# Patient Record
Sex: MALE | Race: WHITE | NOT HISPANIC OR LATINO
[De-identification: names, ages, dates, MRNs, and addresses within clinical notes are randomized per-mention and may not be internally consistent; named-entity substitution may affect disease eponyms.]

---

## 2020-10-12 ENCOUNTER — TRANSCRIPTION ENCOUNTER (OUTPATIENT)
Age: 65
End: 2020-10-12

## 2022-04-04 PROBLEM — Z00.00 ENCOUNTER FOR PREVENTIVE HEALTH EXAMINATION: Status: ACTIVE | Noted: 2022-04-04

## 2022-04-06 ENCOUNTER — APPOINTMENT (OUTPATIENT)
Dept: NEUROSURGERY | Facility: CLINIC | Age: 67
End: 2022-04-06
Payer: COMMERCIAL

## 2022-04-06 DIAGNOSIS — M47.816 SPONDYLOSIS W/OUT MYELOPATHY OR RADICULOPATHY, LUMBAR REGION: ICD-10-CM

## 2022-04-06 DIAGNOSIS — M54.16 RADICULOPATHY, LUMBAR REGION: ICD-10-CM

## 2022-04-06 PROCEDURE — 99203 OFFICE O/P NEW LOW 30 MIN: CPT | Mod: 95

## 2022-05-06 ENCOUNTER — INPATIENT (INPATIENT)
Facility: HOSPITAL | Age: 67
LOS: 0 days | Discharge: HOME | DRG: 93 | End: 2022-05-07
Attending: NEUROLOGICAL SURGERY | Admitting: NEUROLOGICAL SURGERY
Payer: COMMERCIAL

## 2022-05-06 VITALS
SYSTOLIC BLOOD PRESSURE: 166 MMHG | HEART RATE: 83 BPM | OXYGEN SATURATION: 97 % | WEIGHT: 167.99 LBS | DIASTOLIC BLOOD PRESSURE: 96 MMHG | TEMPERATURE: 98 F | RESPIRATION RATE: 18 BRPM | HEIGHT: 70 IN

## 2022-05-06 DIAGNOSIS — M54.9 DORSALGIA, UNSPECIFIED: ICD-10-CM

## 2022-05-06 DIAGNOSIS — K21.9 GASTRO-ESOPHAGEAL REFLUX DISEASE WITHOUT ESOPHAGITIS: ICD-10-CM

## 2022-05-06 DIAGNOSIS — F90.9 ATTENTION-DEFICIT HYPERACTIVITY DISORDER, UNSPECIFIED TYPE: ICD-10-CM

## 2022-05-06 LAB
ALBUMIN SERPL ELPH-MCNC: 4.5 G/DL — SIGNIFICANT CHANGE UP (ref 3.3–5)
ALP SERPL-CCNC: 84 U/L — SIGNIFICANT CHANGE UP (ref 40–120)
ALT FLD-CCNC: 38 U/L — SIGNIFICANT CHANGE UP (ref 10–45)
ANION GAP SERPL CALC-SCNC: 17 MMOL/L — SIGNIFICANT CHANGE UP (ref 5–17)
APTT BLD: 27.7 SEC — SIGNIFICANT CHANGE UP (ref 27.5–35.5)
AST SERPL-CCNC: 30 U/L — SIGNIFICANT CHANGE UP (ref 10–40)
BILIRUB SERPL-MCNC: 0.4 MG/DL — SIGNIFICANT CHANGE UP (ref 0.2–1.2)
BLD GP AB SCN SERPL QL: NEGATIVE — SIGNIFICANT CHANGE UP
BUN SERPL-MCNC: 14 MG/DL — SIGNIFICANT CHANGE UP (ref 7–23)
CALCIUM SERPL-MCNC: 9.3 MG/DL — SIGNIFICANT CHANGE UP (ref 8.4–10.5)
CHLORIDE SERPL-SCNC: 102 MMOL/L — SIGNIFICANT CHANGE UP (ref 96–108)
CO2 SERPL-SCNC: 24 MMOL/L — SIGNIFICANT CHANGE UP (ref 22–31)
CREAT SERPL-MCNC: 0.83 MG/DL — SIGNIFICANT CHANGE UP (ref 0.5–1.3)
EGFR: 98 ML/MIN/1.73M2 — SIGNIFICANT CHANGE UP
GLUCOSE SERPL-MCNC: 101 MG/DL — HIGH (ref 70–99)
HCT VFR BLD CALC: 46.5 % — SIGNIFICANT CHANGE UP (ref 39–50)
HGB BLD-MCNC: 15.6 G/DL — SIGNIFICANT CHANGE UP (ref 13–17)
INR BLD: 0.89 — SIGNIFICANT CHANGE UP (ref 0.88–1.16)
MCHC RBC-ENTMCNC: 32.2 PG — SIGNIFICANT CHANGE UP (ref 27–34)
MCHC RBC-ENTMCNC: 33.5 GM/DL — SIGNIFICANT CHANGE UP (ref 32–36)
MCV RBC AUTO: 96.1 FL — SIGNIFICANT CHANGE UP (ref 80–100)
NRBC # BLD: 0 /100 WBCS — SIGNIFICANT CHANGE UP (ref 0–0)
PLATELET # BLD AUTO: 222 K/UL — SIGNIFICANT CHANGE UP (ref 150–400)
POTASSIUM SERPL-MCNC: 4.1 MMOL/L — SIGNIFICANT CHANGE UP (ref 3.5–5.3)
POTASSIUM SERPL-SCNC: 4.1 MMOL/L — SIGNIFICANT CHANGE UP (ref 3.5–5.3)
PROT SERPL-MCNC: 7.5 G/DL — SIGNIFICANT CHANGE UP (ref 6–8.3)
PROTHROM AB SERPL-ACNC: 10.6 SEC — SIGNIFICANT CHANGE UP (ref 10.5–13.4)
RBC # BLD: 4.84 M/UL — SIGNIFICANT CHANGE UP (ref 4.2–5.8)
RBC # FLD: 12.3 % — SIGNIFICANT CHANGE UP (ref 10.3–14.5)
RH IG SCN BLD-IMP: POSITIVE — SIGNIFICANT CHANGE UP
SARS-COV-2 RNA SPEC QL NAA+PROBE: NEGATIVE — SIGNIFICANT CHANGE UP
SODIUM SERPL-SCNC: 143 MMOL/L — SIGNIFICANT CHANGE UP (ref 135–145)
WBC # BLD: 4.53 K/UL — SIGNIFICANT CHANGE UP (ref 3.8–10.5)
WBC # FLD AUTO: 4.53 K/UL — SIGNIFICANT CHANGE UP (ref 3.8–10.5)

## 2022-05-06 PROCEDURE — 72148 MRI LUMBAR SPINE W/O DYE: CPT | Mod: 26,MA

## 2022-05-06 PROCEDURE — 99285 EMERGENCY DEPT VISIT HI MDM: CPT

## 2022-05-06 RX ORDER — ONDANSETRON 8 MG/1
4 TABLET, FILM COATED ORAL EVERY 6 HOURS
Refills: 0 | Status: DISCONTINUED | OUTPATIENT
Start: 2022-05-06 | End: 2022-05-07

## 2022-05-06 RX ORDER — ONDANSETRON 8 MG/1
4 TABLET, FILM COATED ORAL ONCE
Refills: 0 | Status: COMPLETED | OUTPATIENT
Start: 2022-05-06 | End: 2022-05-06

## 2022-05-06 RX ORDER — HYDROMORPHONE HYDROCHLORIDE 2 MG/ML
2 INJECTION INTRAMUSCULAR; INTRAVENOUS; SUBCUTANEOUS EVERY 4 HOURS
Refills: 0 | Status: DISCONTINUED | OUTPATIENT
Start: 2022-05-06 | End: 2022-05-07

## 2022-05-06 RX ORDER — DIAZEPAM 5 MG
5 TABLET ORAL ONCE
Refills: 0 | Status: DISCONTINUED | OUTPATIENT
Start: 2022-05-06 | End: 2022-05-06

## 2022-05-06 RX ORDER — METHYLPHENIDATE HCL 5 MG
27 TABLET ORAL DAILY
Refills: 0 | Status: DISCONTINUED | OUTPATIENT
Start: 2022-05-07 | End: 2022-05-07

## 2022-05-06 RX ORDER — DEXAMETHASONE 0.5 MG/5ML
10 ELIXIR ORAL ONCE
Refills: 0 | Status: DISCONTINUED | OUTPATIENT
Start: 2022-05-06 | End: 2022-05-06

## 2022-05-06 RX ORDER — HYDROMORPHONE HYDROCHLORIDE 2 MG/ML
1 INJECTION INTRAMUSCULAR; INTRAVENOUS; SUBCUTANEOUS ONCE
Refills: 0 | Status: DISCONTINUED | OUTPATIENT
Start: 2022-05-06 | End: 2022-05-06

## 2022-05-06 RX ORDER — ACETAMINOPHEN 500 MG
650 TABLET ORAL EVERY 6 HOURS
Refills: 0 | Status: DISCONTINUED | OUTPATIENT
Start: 2022-05-06 | End: 2022-05-07

## 2022-05-06 RX ORDER — DEXAMETHASONE 0.5 MG/5ML
4 ELIXIR ORAL EVERY 6 HOURS
Refills: 0 | Status: DISCONTINUED | OUTPATIENT
Start: 2022-05-06 | End: 2022-05-07

## 2022-05-06 RX ORDER — PANTOPRAZOLE SODIUM 20 MG/1
40 TABLET, DELAYED RELEASE ORAL
Refills: 0 | Status: DISCONTINUED | OUTPATIENT
Start: 2022-05-06 | End: 2022-05-07

## 2022-05-06 RX ORDER — PANTOPRAZOLE SODIUM 20 MG/1
40 TABLET, DELAYED RELEASE ORAL
Refills: 0 | Status: DISCONTINUED | OUTPATIENT
Start: 2022-05-06 | End: 2022-05-06

## 2022-05-06 RX ORDER — HYDROMORPHONE HYDROCHLORIDE 2 MG/ML
4 INJECTION INTRAMUSCULAR; INTRAVENOUS; SUBCUTANEOUS EVERY 4 HOURS
Refills: 0 | Status: DISCONTINUED | OUTPATIENT
Start: 2022-05-06 | End: 2022-05-07

## 2022-05-06 RX ORDER — LIDOCAINE 4 G/100G
1 CREAM TOPICAL ONCE
Refills: 0 | Status: COMPLETED | OUTPATIENT
Start: 2022-05-06 | End: 2022-05-06

## 2022-05-06 RX ORDER — ENOXAPARIN SODIUM 100 MG/ML
40 INJECTION SUBCUTANEOUS EVERY 24 HOURS
Refills: 0 | Status: DISCONTINUED | OUTPATIENT
Start: 2022-05-06 | End: 2022-05-07

## 2022-05-06 RX ORDER — DIAZEPAM 5 MG
2 TABLET ORAL EVERY 8 HOURS
Refills: 0 | Status: DISCONTINUED | OUTPATIENT
Start: 2022-05-06 | End: 2022-05-07

## 2022-05-06 RX ORDER — DEXAMETHASONE 0.5 MG/5ML
10 ELIXIR ORAL ONCE
Refills: 0 | Status: COMPLETED | OUTPATIENT
Start: 2022-05-06 | End: 2022-05-06

## 2022-05-06 RX ORDER — ACETAMINOPHEN 500 MG
1000 TABLET ORAL ONCE
Refills: 0 | Status: COMPLETED | OUTPATIENT
Start: 2022-05-06 | End: 2022-05-06

## 2022-05-06 RX ADMIN — HYDROMORPHONE HYDROCHLORIDE 1 MILLIGRAM(S): 2 INJECTION INTRAMUSCULAR; INTRAVENOUS; SUBCUTANEOUS at 11:20

## 2022-05-06 RX ADMIN — Medication 1000 MILLIGRAM(S): at 12:20

## 2022-05-06 RX ADMIN — HYDROMORPHONE HYDROCHLORIDE 1 MILLIGRAM(S): 2 INJECTION INTRAMUSCULAR; INTRAVENOUS; SUBCUTANEOUS at 09:46

## 2022-05-06 RX ADMIN — Medication 5 MILLIGRAM(S): at 11:40

## 2022-05-06 RX ADMIN — HYDROMORPHONE HYDROCHLORIDE 1 MILLIGRAM(S): 2 INJECTION INTRAMUSCULAR; INTRAVENOUS; SUBCUTANEOUS at 09:05

## 2022-05-06 RX ADMIN — ONDANSETRON 4 MILLIGRAM(S): 8 TABLET, FILM COATED ORAL at 11:03

## 2022-05-06 RX ADMIN — HYDROMORPHONE HYDROCHLORIDE 1 MILLIGRAM(S): 2 INJECTION INTRAMUSCULAR; INTRAVENOUS; SUBCUTANEOUS at 11:53

## 2022-05-06 RX ADMIN — Medication 400 MILLIGRAM(S): at 11:53

## 2022-05-06 RX ADMIN — Medication 102 MILLIGRAM(S): at 11:03

## 2022-05-06 RX ADMIN — LIDOCAINE 1 PATCH: 4 CREAM TOPICAL at 19:21

## 2022-05-06 RX ADMIN — HYDROMORPHONE HYDROCHLORIDE 4 MILLIGRAM(S): 2 INJECTION INTRAMUSCULAR; INTRAVENOUS; SUBCUTANEOUS at 23:25

## 2022-05-06 RX ADMIN — LIDOCAINE 1 PATCH: 4 CREAM TOPICAL at 11:40

## 2022-05-06 RX ADMIN — Medication 4 MILLIGRAM(S): at 18:32

## 2022-05-06 RX ADMIN — LIDOCAINE 1 PATCH: 4 CREAM TOPICAL at 22:20

## 2022-05-06 RX ADMIN — Medication 2 MILLIGRAM(S): at 21:04

## 2022-05-06 RX ADMIN — Medication 50 MILLIGRAM(S): at 18:35

## 2022-05-06 RX ADMIN — ONDANSETRON 4 MILLIGRAM(S): 8 TABLET, FILM COATED ORAL at 22:24

## 2022-05-06 RX ADMIN — ENOXAPARIN SODIUM 40 MILLIGRAM(S): 100 INJECTION SUBCUTANEOUS at 21:04

## 2022-05-06 RX ADMIN — Medication 4 MILLIGRAM(S): at 23:27

## 2022-05-06 RX ADMIN — PANTOPRAZOLE SODIUM 40 MILLIGRAM(S): 20 TABLET, DELAYED RELEASE ORAL at 18:32

## 2022-05-06 RX ADMIN — HYDROMORPHONE HYDROCHLORIDE 4 MILLIGRAM(S): 2 INJECTION INTRAMUSCULAR; INTRAVENOUS; SUBCUTANEOUS at 22:24

## 2022-05-06 RX ADMIN — HYDROMORPHONE HYDROCHLORIDE 1 MILLIGRAM(S): 2 INJECTION INTRAMUSCULAR; INTRAVENOUS; SUBCUTANEOUS at 09:35

## 2022-05-06 RX ADMIN — ONDANSETRON 4 MILLIGRAM(S): 8 TABLET, FILM COATED ORAL at 12:10

## 2022-05-06 NOTE — ED ADULT TRIAGE NOTE - CHIEF COMPLAINT QUOTE
pt with hx of back surgery presents today with R sided back pain. 3 weeks ago had epidural, one week ago lifted heavy box with L sided back pain. Now c/o severe R sided back pain radiating down R hip and leg. Sent in by Dr. Diana for MRI. Denies bladder/bowel incontinence.

## 2022-05-06 NOTE — ED ADULT NURSE NOTE - OBJECTIVE STATEMENT
.  66years male alert mental state (AOX3) received by EMS.  -Allergy: N/A.  -complain of dull type of back pain and radiating Rt hip& leg pain. Rt leg numbness.  Hx of  back surgery.  Sent in by Dr. Diana for MRI.  -denied chest pain, SOB, dizziness, headache, n/v/d, Denies bladder/bowel incontinence.

## 2022-05-06 NOTE — ED PROVIDER NOTE - PHYSICAL EXAMINATION
GENERAL: Initial presentation with moderate to severe pain.   NEURO: Alert and oriented. CN II-XII intact. Motor strength 5/5 in all extremities.   HEAD: NC/AT  EYES: Clear bilaterally; Pupils equal and round  ENT: OP clear, no rhinorrhea  PULM: No signs of respiratory distress; lungs clear bilaterally  CV: RRR, S1S2, No MRG. Symmetric distal pulses bilaterally.  GI: Abdomen soft, nontender.  No abdominal masses or abnormal pulsations appreciated.    SKIN: normal color and turgor; no rash or lesions  MSK: no CVAT, no ecchymosis, no testicular pain/swelling and no ttp, no palpable groin hernia/mass, no spinal ttp and no step-offs

## 2022-05-06 NOTE — ED PROVIDER NOTE - CLINICAL SUMMARY MEDICAL DECISION MAKING FREE TEXT BOX
67 y/o male with a PMHx of back pain with recent epidural x3 weeks ago no constitutional symptoms, ambulatory in the ED who presents to the ED with acute right lower back pain s/p bending over and lifting a heavy object. Pt was upgraded due to severe pain which was managed in the ED, MR lumbar expedited. NS consulted. Plan for labs, continued pain control and follow up with imaging.

## 2022-05-06 NOTE — H&P ADULT - NSHPLABSRESULTS_GEN_ALL_CORE
15.6   4.53  )-----------( 222      ( 06 May 2022 09:09 )             46.5   05-06    143  |  102  |  14  ----------------------------<  101<H>  4.1   |  24  |  0.83    Ca    9.3      06 May 2022 09:09    TPro  7.5  /  Alb  4.5  /  TBili  0.4  /  DBili  x   /  AST  30  /  ALT  38  /  AlkPhos  84  05-06  PT/INR - ( 06 May 2022 09:09 )   PT: 10.6 sec;   INR: 0.89          PTT - ( 06 May 2022 09:09 )  PTT:27.7 sec    Brain MRI: FINDINGS: Study is limited by motion artifact.    ALIGNMENT: There is mild levocurvature of the lumbar spine.    VERTEBRAE: There is degenerative loss of vertebral body height at the L3   level without compression deformity. The vertebral body heights are   otherwise maintained. There are degenerative Schmorl's nodes at the L2   and L3 vertebral bodies. There are fatty and edematous endplate   degenerative changes at L5-S1..    DISCS: There is multilevel loss of disc signal with moderate disc space   narrowing, worst at the L5-S1 level.    CONUS MEDULLARIS AND CAUDA EQUINA: The conus medullaris terminates at   T12-L1. The conus medullaris is normal in signal and morphology.    PARAVERTEBRAL SOFT TISSUES AND VISUALIZED RETROPERITONEUM: Within normal   limits.    EVALUATION OF INDIVIDUAL LEVELS:    L1-2: No disc herniation, spinal canal stenosis or neural foraminal   narrowing.    L2-3: There is circumferential disc bulging with annular fissure along   with facet and ligamentum flavum hypertrophy resulting moderate central   canal stenosis. There is moderate right and mild to moderate left   bilateral neural foraminal narrowing..  The disc bulge contacts the descending right L3 nerve root.  L3-4: There is posterior disc bulge without significant central canal   stenosis. There is facet hypertrophy causing mild bilateral lateral   recess and neural foraminal stenosis.    L4-5: There is disc bulge with superimposed small central disc protrusion   without significant central canal stenosis. Status post right   hemilaminotomy. There is facet hypertrophy withmoderate bilateral neural   foraminal narrowing.    L5-S1: There is a disc bulge with a superimposed central/left paracentral   disc protrusion without significant spinal canal stenosis. There is facet   hypertrophy with severe left and at least moderate right neural foraminal   narrowing.    LIMITED EVALUATION OF UPPER SACRUM AND SACROILIAC JOINTS: No normal   limits.    IMPRESSION:    No fracture or malalignment. Images are degraded by motion.    L2-3 moderate spinal canal stenosis. The disc bulge contacts the   descending right L3 nerve root, correlation with patient's symptoms is   recommended..    L5-S1 severe left and at least moderate right neural foraminal narrowing.    L4-5 status post right hemilaminotomy with moderate bilateral neural   foraminal narrowing.

## 2022-05-06 NOTE — ED PROVIDER NOTE - ATTENDING APP SHARED VISIT CONTRIBUTION OF CARE
65 yo , PMH , ADHD, chronic lower back pain, complaining of acute on chronic low back pain,   3 days ago, bent over, new Rt lower back pain ( usually L) radiating to R leg,   no cord compression , or collection on MRI,   pain difficult to control, no deficits, seen by surgery   admitted for pain control / further evaluation.

## 2022-05-06 NOTE — ED PROVIDER NOTE - OBJECTIVE STATEMENT
65 y/o male with a PMHx of chronic back pain (s/p epidural/L4-L5 HNP) is here in the ER c/o acute on chronic lower back pain x3 days. Pt states he has a hx of left lower back pain and x3 days ago he bent over to  a heavy object and since then has been experiencing pain to his right lower back. Pain is constant and increases with movement that has increased in intensity and now 10/10 sharp. Pain is located on his right lower back with radiation of pain down his right groin/thigh. He has been self-medicating with advil without improvement of his pain. He denies the following: fever, chills, fall, chest pain, sob, flank/abdominal pain, urinary symptoms, numbness to extremities, saddle anesthesia, loss of urine/bm, testicular/penile swelling/pain.

## 2022-05-06 NOTE — H&P ADULT - HISTORY OF PRESENT ILLNESS
67 y/o male with a PMHx of chronic back pain (s/p epidural/L4-L5 HNP 3 weeks ago) sent to  ER or evaluation of worsening of his  chronic  lower back pain x3 days. Pt states he has a hx of left lower back pain and x3 days ago he bent over to  a heavy object and since then has been experiencing pain to his right lower back. Pain is constant and increases with movement that has increased in intensity and now 10/10 sharp. Pain is located on his right lower back with radiation of pain down his right groin/thigh. He has been self-medicating with advil without improvement of his pain. He denies the following: fever, chills, fall, chest pain, sob, flank/abdominal pain, urinary symptoms, numbness to extremities, saddle anesthesia, loss of urine/bm, testicular/penile swelling/pain.  Patient follows with a pain management doctor who gave him an epidural injection of L4-5 3 weeks ago. Patient states that it helped him in the early days. Patient has history of ADHD takes concerta and Nuvigil for it.

## 2022-05-06 NOTE — ED PROVIDER NOTE - NS ED ATTENDING STATEMENT MOD
This was a shared visit with the LUCERO. I reviewed and verified the documentation and independently performed the documented:

## 2022-05-06 NOTE — ED PROVIDER NOTE - NS ED ROS FT
CONSTITUTIONAL: No fever/chills.  NEURO: no headache; no tingling/numbness/weakness in extremities; no saddle anesthesia.  CV: no chest pain or palpitations  PULM: no dyspnea, cough, or hemoptysis  GI: no abdominal pain; no N/V; no BRBPR or melena; no diarrhea or fecal incontinence  : no dysuria/hematura/frequency; no urinary incontinence or retention  MSK: no neck pain; no joint pain; no swelling of extremities, + Right lower back pain with radiation to the right groin/thigh  DERM: no rash or lesions

## 2022-05-06 NOTE — H&P ADULT - NSICDXPASTMEDICALHX_GEN_ALL_CORE_FT
PAST MEDICAL HISTORY:  Adult ADHD     GERD (gastroesophageal reflux disease)     S/P lumbar laminectomy     Severe back pain

## 2022-05-06 NOTE — ED ADULT NURSE NOTE - NS ED NURSE LEVEL OF CONSCIOUSNESS AFFECT
From: Ashia Dutton  To: Evelia Almaraz  Sent: 11/29/2021 8:18 AM CST  Subject: Flu shot    Would like to get my flu shot. Where should I go?   Calm

## 2022-05-06 NOTE — H&P ADULT - NSHPPHYSICALEXAM_GEN_ALL_CORE
Gen: Well developed, well groomed male in severe distress secondary to pain. Patient can not stay put for proper physical examination.  Neuro: Patient is Alert ,awake, oriented x 3  PERRL, EOMI, CN II to XII are grossly intact.  Lung: clear lung sound  Heart: S1S2, regular  Abd: Soft, non tender, +BS.  Ext: Unable to fully access for focal motor strength for patient does cooperate due to pain.   No focal motor deficit noted. 5/5 x 4.  No sensory deficit to touch.

## 2022-05-07 ENCOUNTER — TRANSCRIPTION ENCOUNTER (OUTPATIENT)
Age: 67
End: 2022-05-07

## 2022-05-07 VITALS
RESPIRATION RATE: 18 BRPM | TEMPERATURE: 98 F | HEART RATE: 100 BPM | SYSTOLIC BLOOD PRESSURE: 161 MMHG | DIASTOLIC BLOOD PRESSURE: 98 MMHG | OXYGEN SATURATION: 96 %

## 2022-05-07 LAB
A1C WITH ESTIMATED AVERAGE GLUCOSE RESULT: 4.9 % — SIGNIFICANT CHANGE UP (ref 4–5.6)
ALBUMIN SERPL ELPH-MCNC: 4.2 G/DL — SIGNIFICANT CHANGE UP (ref 3.3–5)
ALP SERPL-CCNC: 76 U/L — SIGNIFICANT CHANGE UP (ref 40–120)
ALT FLD-CCNC: 22 U/L — SIGNIFICANT CHANGE UP (ref 10–45)
ANION GAP SERPL CALC-SCNC: 14 MMOL/L — SIGNIFICANT CHANGE UP (ref 5–17)
AST SERPL-CCNC: 22 U/L — SIGNIFICANT CHANGE UP (ref 10–40)
BASOPHILS # BLD AUTO: 0.01 K/UL — SIGNIFICANT CHANGE UP (ref 0–0.2)
BASOPHILS NFR BLD AUTO: 0.1 % — SIGNIFICANT CHANGE UP (ref 0–2)
BILIRUB SERPL-MCNC: 0.4 MG/DL — SIGNIFICANT CHANGE UP (ref 0.2–1.2)
BUN SERPL-MCNC: 15 MG/DL — SIGNIFICANT CHANGE UP (ref 7–23)
CALCIUM SERPL-MCNC: 9 MG/DL — SIGNIFICANT CHANGE UP (ref 8.4–10.5)
CHLORIDE SERPL-SCNC: 100 MMOL/L — SIGNIFICANT CHANGE UP (ref 96–108)
CO2 SERPL-SCNC: 24 MMOL/L — SIGNIFICANT CHANGE UP (ref 22–31)
CREAT SERPL-MCNC: 0.78 MG/DL — SIGNIFICANT CHANGE UP (ref 0.5–1.3)
EGFR: 100 ML/MIN/1.73M2 — SIGNIFICANT CHANGE UP
EOSINOPHIL # BLD AUTO: 0 K/UL — SIGNIFICANT CHANGE UP (ref 0–0.5)
EOSINOPHIL NFR BLD AUTO: 0 % — SIGNIFICANT CHANGE UP (ref 0–6)
ESTIMATED AVERAGE GLUCOSE: 94 MG/DL — SIGNIFICANT CHANGE UP (ref 68–114)
GLUCOSE SERPL-MCNC: 132 MG/DL — HIGH (ref 70–99)
HCT VFR BLD CALC: 42.8 % — SIGNIFICANT CHANGE UP (ref 39–50)
HCV AB S/CO SERPL IA: 0.04 S/CO — SIGNIFICANT CHANGE UP
HCV AB SERPL-IMP: SIGNIFICANT CHANGE UP
HGB BLD-MCNC: 14.3 G/DL — SIGNIFICANT CHANGE UP (ref 13–17)
IMM GRANULOCYTES NFR BLD AUTO: 0.9 % — SIGNIFICANT CHANGE UP (ref 0–1.5)
LYMPHOCYTES # BLD AUTO: 0.84 K/UL — LOW (ref 1–3.3)
LYMPHOCYTES # BLD AUTO: 10.5 % — LOW (ref 13–44)
MAGNESIUM SERPL-MCNC: 1.8 MG/DL — SIGNIFICANT CHANGE UP (ref 1.6–2.6)
MCHC RBC-ENTMCNC: 31.6 PG — SIGNIFICANT CHANGE UP (ref 27–34)
MCHC RBC-ENTMCNC: 33.4 GM/DL — SIGNIFICANT CHANGE UP (ref 32–36)
MCV RBC AUTO: 94.7 FL — SIGNIFICANT CHANGE UP (ref 80–100)
MONOCYTES # BLD AUTO: 0.44 K/UL — SIGNIFICANT CHANGE UP (ref 0–0.9)
MONOCYTES NFR BLD AUTO: 5.5 % — SIGNIFICANT CHANGE UP (ref 2–14)
NEUTROPHILS # BLD AUTO: 6.62 K/UL — SIGNIFICANT CHANGE UP (ref 1.8–7.4)
NEUTROPHILS NFR BLD AUTO: 83 % — HIGH (ref 43–77)
NRBC # BLD: 0 /100 WBCS — SIGNIFICANT CHANGE UP (ref 0–0)
PHOSPHATE SERPL-MCNC: 3.6 MG/DL — SIGNIFICANT CHANGE UP (ref 2.5–4.5)
PLATELET # BLD AUTO: 209 K/UL — SIGNIFICANT CHANGE UP (ref 150–400)
POTASSIUM SERPL-MCNC: 4.3 MMOL/L — SIGNIFICANT CHANGE UP (ref 3.5–5.3)
POTASSIUM SERPL-SCNC: 4.3 MMOL/L — SIGNIFICANT CHANGE UP (ref 3.5–5.3)
PROT SERPL-MCNC: 6.6 G/DL — SIGNIFICANT CHANGE UP (ref 6–8.3)
RBC # BLD: 4.52 M/UL — SIGNIFICANT CHANGE UP (ref 4.2–5.8)
RBC # FLD: 12.2 % — SIGNIFICANT CHANGE UP (ref 10.3–14.5)
SODIUM SERPL-SCNC: 138 MMOL/L — SIGNIFICANT CHANGE UP (ref 135–145)
WBC # BLD: 7.98 K/UL — SIGNIFICANT CHANGE UP (ref 3.8–10.5)
WBC # FLD AUTO: 7.98 K/UL — SIGNIFICANT CHANGE UP (ref 3.8–10.5)

## 2022-05-07 PROCEDURE — 96376 TX/PRO/DX INJ SAME DRUG ADON: CPT

## 2022-05-07 PROCEDURE — 83036 HEMOGLOBIN GLYCOSYLATED A1C: CPT

## 2022-05-07 PROCEDURE — 86803 HEPATITIS C AB TEST: CPT

## 2022-05-07 PROCEDURE — 96365 THER/PROPH/DIAG IV INF INIT: CPT

## 2022-05-07 PROCEDURE — 97161 PT EVAL LOW COMPLEX 20 MIN: CPT

## 2022-05-07 PROCEDURE — 72148 MRI LUMBAR SPINE W/O DYE: CPT | Mod: MA

## 2022-05-07 PROCEDURE — 99285 EMERGENCY DEPT VISIT HI MDM: CPT

## 2022-05-07 PROCEDURE — 84100 ASSAY OF PHOSPHORUS: CPT

## 2022-05-07 PROCEDURE — 80053 COMPREHEN METABOLIC PANEL: CPT

## 2022-05-07 PROCEDURE — 85610 PROTHROMBIN TIME: CPT

## 2022-05-07 PROCEDURE — 86850 RBC ANTIBODY SCREEN: CPT

## 2022-05-07 PROCEDURE — 85025 COMPLETE CBC W/AUTO DIFF WBC: CPT

## 2022-05-07 PROCEDURE — 86901 BLOOD TYPING SEROLOGIC RH(D): CPT

## 2022-05-07 PROCEDURE — 83735 ASSAY OF MAGNESIUM: CPT

## 2022-05-07 PROCEDURE — 96375 TX/PRO/DX INJ NEW DRUG ADDON: CPT

## 2022-05-07 PROCEDURE — 86900 BLOOD TYPING SEROLOGIC ABO: CPT

## 2022-05-07 PROCEDURE — 85730 THROMBOPLASTIN TIME PARTIAL: CPT

## 2022-05-07 PROCEDURE — 85027 COMPLETE CBC AUTOMATED: CPT

## 2022-05-07 PROCEDURE — 36415 COLL VENOUS BLD VENIPUNCTURE: CPT

## 2022-05-07 PROCEDURE — 87635 SARS-COV-2 COVID-19 AMP PRB: CPT

## 2022-05-07 RX ORDER — DEXAMETHASONE 0.5 MG/5ML
1 ELIXIR ORAL
Qty: 1 | Refills: 0
Start: 2022-05-07

## 2022-05-07 RX ORDER — PANTOPRAZOLE SODIUM 20 MG/1
1 TABLET, DELAYED RELEASE ORAL
Qty: 10 | Refills: 0
Start: 2022-05-07 | End: 2022-05-11

## 2022-05-07 RX ORDER — DEXAMETHASONE 0.5 MG/5ML
1 ELIXIR ORAL
Qty: 0 | Refills: 0 | DISCHARGE
Start: 2022-05-07

## 2022-05-07 RX ORDER — HYDROMORPHONE HYDROCHLORIDE 2 MG/ML
1 INJECTION INTRAMUSCULAR; INTRAVENOUS; SUBCUTANEOUS
Qty: 30 | Refills: 0
Start: 2022-05-07 | End: 2022-05-11

## 2022-05-07 RX ORDER — METHYLPHENIDATE HCL 5 MG
1 TABLET ORAL
Qty: 0 | Refills: 0 | DISCHARGE
Start: 2022-05-07

## 2022-05-07 RX ORDER — ACETAMINOPHEN 500 MG
2 TABLET ORAL
Qty: 0 | Refills: 0 | DISCHARGE
Start: 2022-05-07

## 2022-05-07 RX ORDER — DIAZEPAM 5 MG
1 TABLET ORAL
Qty: 42 | Refills: 0
Start: 2022-05-07 | End: 2022-05-20

## 2022-05-07 RX ORDER — DEXAMETHASONE 0.5 MG/5ML
2 ELIXIR ORAL
Qty: 24 | Refills: 0
Start: 2022-05-07

## 2022-05-07 RX ORDER — DIAZEPAM 5 MG
1 TABLET ORAL
Qty: 45 | Refills: 0
Start: 2022-05-07 | End: 2022-05-21

## 2022-05-07 RX ADMIN — HYDROMORPHONE HYDROCHLORIDE 4 MILLIGRAM(S): 2 INJECTION INTRAMUSCULAR; INTRAVENOUS; SUBCUTANEOUS at 02:42

## 2022-05-07 RX ADMIN — Medication 50 MILLIGRAM(S): at 01:16

## 2022-05-07 RX ADMIN — Medication 2 MILLIGRAM(S): at 05:04

## 2022-05-07 RX ADMIN — HYDROMORPHONE HYDROCHLORIDE 4 MILLIGRAM(S): 2 INJECTION INTRAMUSCULAR; INTRAVENOUS; SUBCUTANEOUS at 03:40

## 2022-05-07 RX ADMIN — Medication 4 MILLIGRAM(S): at 05:04

## 2022-05-07 RX ADMIN — PANTOPRAZOLE SODIUM 40 MILLIGRAM(S): 20 TABLET, DELAYED RELEASE ORAL at 05:04

## 2022-05-07 NOTE — PROGRESS NOTE ADULT - SUBJECTIVE AND OBJECTIVE BOX
HPI:  65 y/o male with a PMHx of chronic back pain (s/p epidural/L4-L5 HNP 3 weeks ago) sent to  ER or evaluation of worsening of his  chronic  lower back pain x3 days. Pt states he has a hx of left lower back pain and x3 days ago he bent over to  a heavy object and since then has been experiencing pain to his right lower back. Pain is constant and increases with movement that has increased in intensity and now 10/10 sharp. Pain is located on his right lower back with radiation of pain down his right groin/thigh. He has been self-medicating with advil without improvement of his pain. He denies the following: fever, chills, fall, chest pain, sob, flank/abdominal pain, urinary symptoms, numbness to extremities, saddle anesthesia, loss of urine/bm, testicular/penile swelling/pain.  Patient follows with a pain management doctor who gave him an epidural injection of L4-5 3 weeks ago. Patient states that it helped him in the early days. Patient has history of ADHD takes concerta and Nuvigil for it. (06 May 2022 14:21)    OVERNIGHT EVENTS: SHAI o/n. Pain better controlled.     Hospital Course:   5/6: Admitted for pain control, pain management following   5/7: SHAI overnight. Pain better controlled, still complaining of breakthrough pain.    Vital Signs Last 24 Hrs  T(C): 36.8 (06 May 2022 20:44), Max: 37.1 (06 May 2022 16:18)  T(F): 98.2 (06 May 2022 20:44), Max: 98.8 (06 May 2022 16:18)  HR: 87 (06 May 2022 20:44) (63 - 87)  BP: 161/95 (06 May 2022 20:44) (155/87 - 180/97)  BP(mean): --  RR: 16 (06 May 2022 20:44) (16 - 18)  SpO2: 97% (06 May 2022 20:44) (96% - 99%)    I&O's Summary      PHYSICAL EXAM:  General: NAD, pt is comfortably sitting up in bed, on room air  HEENT: CN II-XII grossly intact, PERRL 3mm briskly reactive, EOMI b/l, face symmetric  Cardiovascular: RRR, normal S1 and S2   Respiratory: chest rise symmetric  GI: abd soft, NTND   Neuro: A&Ox3, No aphasia, speech clear, no pronator drift. Follows commands.  KING x4 spontaneously, 5/5 strength in all extremities throughout. Sensation intact throughout to light touch.   Extremities: warm & well perfused  Wound/incision: none        TUBES/LINES:  [] Cabral  [] Lumbar Drain  [] Wound Drains  [] Others      DIET:  [] NPO  [x] Mechanical  [] Tube feeds    LABS:                        15.6   4.53  )-----------( 222      ( 06 May 2022 09:09 )             46.5     05-06    143  |  102  |  14  ----------------------------<  101<H>  4.1   |  24  |  0.83    Ca    9.3      06 May 2022 09:09    TPro  7.5  /  Alb  4.5  /  TBili  0.4  /  DBili  x   /  AST  30  /  ALT  38  /  AlkPhos  84  05-06    PT/INR - ( 06 May 2022 09:09 )   PT: 10.6 sec;   INR: 0.89          PTT - ( 06 May 2022 09:09 )  PTT:27.7 sec        CAPILLARY BLOOD GLUCOSE          Drug Levels: [] N/A    CSF Analysis: [] N/A      Allergies    No Known Allergies    Intolerances      MEDICATIONS:  Antibiotics:    Neuro:  acetaminophen     Tablet .. 650 milliGRAM(s) Oral every 6 hours PRN  diazepam    Tablet 2 milliGRAM(s) Oral every 8 hours  HYDROmorphone   Tablet 2 milliGRAM(s) Oral every 4 hours PRN  HYDROmorphone   Tablet 4 milliGRAM(s) Oral every 4 hours PRN  methylphenidate ER (CONCERTA) 27 milliGRAM(s) Oral daily  ondansetron Injectable 4 milliGRAM(s) IV Push every 6 hours PRN  pregabalin 50 milliGRAM(s) Oral every 8 hours    Anticoagulation:  enoxaparin Injectable 40 milliGRAM(s) SubCutaneous every 24 hours    OTHER:  dexAMETHasone     Tablet 4 milliGRAM(s) Oral every 6 hours  pantoprazole    Tablet 40 milliGRAM(s) Oral two times a day    IVF:    CULTURES:    RADIOLOGY & ADDITIONAL TESTS:      ASSESSMENT:  Assessment: 65 y/o male with a PMHx of chronic back pain (s/p epidural/L4-L5 HNP 3 weeks ago) sent to  ER or evaluation of worsening of his  chronic  lower back pain x3 days. MRI showing L2-3 stenosis with disc.     BACK PAIN    Handoff    MEWS Score    No pertinent past medical history    Adult ADHD    GERD (gastroesophageal reflux disease)    Severe back pain    S/P lumbar laminectomy    Back pain    Adult ADHD    GERD (gastroesophageal reflux disease)    Severe back pain    BACK PAIN    [email or web address]      PLAN:  Neuro:  -Neuro/vital checks q4H  -Pain control PRN- pain management consult  -Decadron 4q6 f/u taper  -MRI complete 5/6    Cardio:  -Normotensive SBP goal    Pulm:  -On RA    GI:  -Regular diet  -Bowel regimen  - PPI BID for frequent NSAID use at home    Renal:  -No issues    Endo:  -F/u AM A1C    ID:  -afebrile     Heme/onc:  -SCDs, SQL    Case discussed with Dr. Ybarra        Assessment:  Present when checked    []  GCS  E   V  M     Heart Failure: []Acute, [] acute on chronic , []chronic  Heart Failure:  [] Diastolic (HFpEF), [] Systolic (HFrEF), []Combined (HFpEF and HFrEF), [] RHF, [] Pulm HTN, [] Other    [] ERON, [] ATN, [] AIN, [] other  [] CKD1, [] CKD2, [] CKD 3, [] CKD 4, [] CKD 5, []ESRD    Encephalopathy: [] Metabolic, [] Hepatic, [] toxic, [] Neurological, [] Other    Abnormal Nurtitional Status: [] malnurtition (see nutrition note), [ ]underweight: BMI < 19, [] morbid obesity: BMI >40, [] Cachexia    [] Sepsis  [] hypovolemic shock,[] cardiogenic shock, [] hemorrhagic shock, [] neuogenic shock  [] Acute Respiratory Failure  []Cerebral edema, [] Brain compression/ herniation,   [] Functional quadriplegia  [] Acute blood loss anemia  
Neurosurgical Indications for Screening Dopplers on Admission:       1) Known hypercoagulation disorder (h/o VTE, thrombophilia, HIT, etc.)   2) Admitted from prolonged stay from another institution (straight forward ER transfers not included)  3) Presenting with significant leg immobility   4) Presenting with signs and symptoms of VTE?    5) With significant critical illness, Including "found down" for unknown period of time in HPI  6) With significant neurotrauma (TBI, SCI / TLS spine fractures)   7) Who are comatose   8) With known malignancy (e.g. glioblastoma multiforme, meningioma, etc.). Excludes IA chemo 23hr observation stays  9) On hemodialysis   10) Who have received platelet transfusion or antithrombotic reversal agents recently   11) Who have had recent major orthopedic surgery          Screening dopplers indicated?   Y _ x  N _    DVT Prophylaxis:  _x SCD's   x_ chemoprophylaxis

## 2022-05-07 NOTE — PHYSICAL THERAPY INITIAL EVALUATION ADULT - BED MOBILITY LIMITATIONS, REHAB EVAL
not assessed 2/2 patientreceived/returned standing EOB. Patient demo good strength throughout all functional mobility for independence with bed mobility.

## 2022-05-07 NOTE — DISCHARGE NOTE PROVIDER - NSDCFUADDINST_GEN_ALL_CORE_FT
Follow up with Dr. Ybarra in 1-2 weeks.   Follow up with pain management Dr. Qasim Nogueira in 1 week.   Please follow up with your primary care doctor.     Pain Expectations:  - please take pain meds as prescribed     Medications:  - new meds: Dexamethasone taper (Dexamethasone 4mg PO once at 12PM. continue Dexamethasone 2mg PO every 6 hours x2 days, followed by Dexamethasone 1mg PO every 6 hours x2days)  - pain meds: Hydromorphone 2mg PO every 6 hours as needed. Lyrica 50mg PO every 8 hours. Valium 2mg PO every 8 hours.   - may resume Aspirin 81mg.  - pain medications can cause constipation, you should eat a high fiber diet and may take a stool softener while on pain meds     WITHIN 24 HOURS OF DISCHARGE, PLEASE CONTACT NEURO PA  WITH ANY QUESTIONS OR CONCERNS: [phone]   OTHERWISE, PLEASE CALL THE OFFICE WITH ANY QUESTIONS OR CONCERNS:

## 2022-05-07 NOTE — OCCUPATIONAL THERAPY INITIAL EVALUATION ADULT - ADDITIONAL COMMENTS
Patient lives with his wife in a private home with "many steps." Patient reports being independent with all ADL and IADL prior to this admission.

## 2022-05-07 NOTE — DISCHARGE NOTE PROVIDER - HOSPITAL COURSE
HPI:   67 y/o male with a PMHx of chronic back pain (s/p epidural/L4-L5 HNP 3 weeks ago) sent to  ER or evaluation of worsening of his  chronic  lower back pain x3 days. Pt states he has a hx of left lower back pain and x3 days ago he bent over to  a heavy object and since then has been experiencing pain to his right lower back. Pain is constant and increases with movement that has increased in intensity and now 10/10 sharp. Pain is located on his right lower back with radiation of pain down his right groin/thigh. He has been self-medicating with advil without improvement of his pain. He denies the following: fever, chills, fall, chest pain, sob, flank/abdominal pain, urinary symptoms, numbness to extremities, saddle anesthesia, loss of urine/bm, testicular/penile swelling/pain.  Patient follows with a pain management doctor who gave him an epidural injection of L4-5 3 weeks ago. Patient states that it helped him in the early days. Patient has history of ADHD takes concerta and Nuvigil for it.      Hospital Course:  5/6: Admitted for pain control, pain management following   5/7: SHAI overnight. Pain better controlled, still complaining of breakthrough pain.      Patient evaluated by PT/OT who recommended: home    Hospital course c/b: no complications     Exam on day of discharge:  General: NAD, pt is comfortably sitting up in bed, on room air  HEENT: EOMI b/l, face symmetric, tongue midline, neck FROM  Cardiovascular: Regular rate and rhythm  Respiratory: nonlabored breathing, normal chest rise  GI: abdomen soft, nontender, nondistended  Neuro: CN II-XII grossly intact, PERRL 3mm briskly reactive   A&Ox3, No aphasia, speech clear, no dysmetria, no pronator drift. Follows commands.  KING x4 spontaneously, 5/5 strength in all extremities throughout. sensation intact  Extremities: distal pulses 2+ x4

## 2022-05-07 NOTE — DISCHARGE NOTE NURSING/CASE MANAGEMENT/SOCIAL WORK - PATIENT PORTAL LINK FT
You can access the FollowMyHealth Patient Portal offered by [facility] by registering at the following website: [email or web address]. By joining [employer]’s FollowMyHealth portal, you will also be able to view your health information using other applications (apps) compatible with our system.

## 2022-05-07 NOTE — DISCHARGE NOTE PROVIDER - NSDCMRMEDTOKEN_GEN_ALL_CORE_FT
acetaminophen 325 mg oral tablet: 2 tab(s) orally every 6 hours, As needed, Temp greater or equal to 38C (100.4F), Mild Pain (1 - 3)  aspirin 81 mg oral delayed release tablet: 1 tab(s) orally once a day  dexamethasone 1 mg oral tablet: take 2 tablets every 6 hours x2 days, then take 1 tablet every 6 hours x2days  dexamethasone 4 mg oral tablet: 1 tab(s) orally once at 12:00PM   dexamethasone 4 mg oral tablet: 1 tab(s) orally every 6 hours  diazePAM 2 mg oral tablet: 1 tab(s) orally every 8 hours MDD:3 tablets  HYDROmorphone 2 mg oral tablet: 1 tab(s) orally every 4 hours, As needed, Moderate Pain (4 - 6) MDD:6 tablets  methylphenidate 27 mg/24 hr oral tablet, extended release: 1 tab(s) orally once a day  Nuvigil 150 mg oral tablet: 1 tab(s) orally once a day  pantoprazole 40 mg oral delayed release tablet: 1 tab(s) orally 2 times a day  pregabalin 50 mg oral capsule: 1 cap(s) orally every 8 hours MDD:3 tablets

## 2022-05-07 NOTE — DISCHARGE NOTE PROVIDER - CARE PROVIDER_API CALL
Cory Ybarra)  Neurosurgery  [address], [address]  [place] [address]  Phone: [phone]  Fax: [phone]  Follow Up Time:     WAYNE CERVANTES  Anesthesiology  [address] [address]  Phone: [phone]  Fax: [phone]  Follow Up Time:

## 2022-05-07 NOTE — OCCUPATIONAL THERAPY INITIAL EVALUATION ADULT - MODALITIES TREATMENT COMMENTS
patient completed functional mobility (~300 ft) in hallway and stair negotiation (~15 steps) independently with no LOB and no AD.

## 2022-05-07 NOTE — DISCHARGE NOTE NURSING/CASE MANAGEMENT/SOCIAL WORK - NSDCPEFALRISK_GEN_ALL_CORE
For information on Fall & Injury Prevention, visit: [email or web address] OR  [email or web address] OR  https://www.cdc.gov/steadi/patient.html

## 2022-05-07 NOTE — PHYSICAL THERAPY INITIAL EVALUATION ADULT - GAIT DEVIATIONS NOTED, PT EVAL
Demo appropriate thad, steady gait without AD, no LOB and good navigation of turns and hallway obstacles.

## 2022-05-07 NOTE — PHYSICAL THERAPY INITIAL EVALUATION ADULT - MANUAL MUSCLE TESTING RESULTS, REHAB EVAL
Seated MMT performed: 5/5 strength for BLE for all major joints; (B) TA/GS/EHL/FHL 5/5 strength. BUE >3/5 grossly assessed via functional mobility

## 2022-05-07 NOTE — OCCUPATIONAL THERAPY INITIAL EVALUATION ADULT - PERTINENT HX OF CURRENT PROBLEM, REHAB EVAL
65 y/o male with a PMHx of chronic back pain (s/p epidural/L4-L5 HNP 3 weeks ago) sent to  ER or evaluation of worsening of his  chronic  lower back pain x3 days. MRI showing L2-3 stenosis with disc.

## 2022-05-07 NOTE — PHYSICAL THERAPY INITIAL EVALUATION ADULT - PERTINENT HX OF CURRENT PROBLEM, REHAB EVAL
Assessment: 67 y/o male with a PMHx of chronic back pain (s/p epidural/L4-L5 HNP 3 weeks ago) sent to  ER or evaluation of worsening of his  chronic  lower back pain x3 days. MRI showing L2-3 stenosis with disc.

## 2022-05-07 NOTE — PHYSICAL THERAPY INITIAL EVALUATION ADULT - ADDITIONAL COMMENTS
Patient is community ambulator who lives with his wife in an apartment and private house with "many steps". Patient is independent with all ADLs prior and denies use of any AD while ambulating.

## 2022-05-12 DIAGNOSIS — M51.26 OTHER INTERVERTEBRAL DISC DISPLACEMENT, LUMBAR REGION: ICD-10-CM

## 2022-05-12 DIAGNOSIS — Z79.82 LONG TERM (CURRENT) USE OF ASPIRIN: ICD-10-CM

## 2022-05-12 DIAGNOSIS — G89.29 OTHER CHRONIC PAIN: ICD-10-CM

## 2022-05-12 DIAGNOSIS — F90.9 ATTENTION-DEFICIT HYPERACTIVITY DISORDER, UNSPECIFIED TYPE: ICD-10-CM

## 2022-05-12 DIAGNOSIS — K21.9 GASTRO-ESOPHAGEAL REFLUX DISEASE WITHOUT ESOPHAGITIS: ICD-10-CM

## 2022-05-12 DIAGNOSIS — M48.061 SPINAL STENOSIS, LUMBAR REGION WITHOUT NEUROGENIC CLAUDICATION: ICD-10-CM

## 2022-08-10 RX ORDER — ARMODAFINIL 200 MG/1
1 TABLET ORAL
Qty: 0 | Refills: 0 | DISCHARGE

## 2022-08-10 RX ORDER — ESOMEPRAZOLE MAGNESIUM 40 MG/1
1 CAPSULE, DELAYED RELEASE ORAL
Qty: 0 | Refills: 0 | DISCHARGE

## 2022-08-10 RX ORDER — ASPIRIN/CALCIUM CARB/MAGNESIUM 324 MG
1 TABLET ORAL
Qty: 0 | Refills: 0 | DISCHARGE

## 2022-08-10 RX ORDER — METHYLPHENIDATE HCL 5 MG
1 TABLET ORAL
Qty: 0 | Refills: 0 | DISCHARGE
